# Patient Record
Sex: MALE | Race: BLACK OR AFRICAN AMERICAN | NOT HISPANIC OR LATINO | Employment: FULL TIME | URBAN - METROPOLITAN AREA
[De-identification: names, ages, dates, MRNs, and addresses within clinical notes are randomized per-mention and may not be internally consistent; named-entity substitution may affect disease eponyms.]

---

## 2017-06-27 ENCOUNTER — APPOINTMENT (OUTPATIENT)
Dept: OPHTHALMOLOGY | Age: 47
End: 2017-06-27

## 2017-06-27 ENCOUNTER — OFFICE VISIT (OUTPATIENT)
Dept: OPHTHALMOLOGY | Age: 47
End: 2017-06-27

## 2017-06-27 DIAGNOSIS — H40.003 GLAUCOMA SUSPECT OF BOTH EYES: Primary | ICD-10-CM

## 2017-06-27 PROCEDURE — 92133 CPTRZD OPH DX IMG PST SGM ON: CPT | Performed by: OPTOMETRIST

## 2017-06-27 PROCEDURE — 99213 OFFICE O/P EST LOW 20 MIN: CPT | Performed by: OPTOMETRIST

## 2017-06-27 PROCEDURE — 92083 EXTENDED VISUAL FIELD XM: CPT | Performed by: OPTOMETRIST

## 2017-06-27 ASSESSMENT — VISUAL ACUITY
OD_SC: 20/20
METHOD: SNELLEN - LINEAR
OS_SC: 20/20

## 2017-06-27 ASSESSMENT — TONOMETRY
OD_IOP_MMHG: 12
IOP_METHOD: APPLANATION
OS_IOP_MMHG: 12

## 2017-06-27 ASSESSMENT — PACHYMETRY
OD_CT(UM): 524
OS_CT(UM): 515

## 2018-01-05 ENCOUNTER — OFFICE VISIT (OUTPATIENT)
Dept: OPHTHALMOLOGY | Age: 48
End: 2018-01-05

## 2018-01-05 DIAGNOSIS — H40.9 MILD STAGE GLAUCOMA(365.71): Primary | ICD-10-CM

## 2018-01-05 PROCEDURE — 92014 COMPRE OPH EXAM EST PT 1/>: CPT | Performed by: OPTOMETRIST

## 2018-01-05 RX ORDER — LATANOPROST 50 UG/ML
1 SOLUTION/ DROPS OPHTHALMIC NIGHTLY
Qty: 2.5 ML | Refills: 12 | Status: SHIPPED | OUTPATIENT
Start: 2018-01-05 | End: 2019-01-05

## 2018-01-05 ASSESSMENT — VISUAL ACUITY
OS_SC: 20/20
METHOD: SNELLEN - LINEAR
OD_SC: 20/20

## 2018-01-05 ASSESSMENT — CONF VISUAL FIELD
OS_NORMAL: 1
OD_NORMAL: 1
METHOD: COUNTING FINGERS

## 2018-01-05 ASSESSMENT — TONOMETRY
IOP_METHOD: NON-CONTACT AIR PUFF
OS_IOP_MMHG: 11
OD_IOP_MMHG: 12

## 2018-01-05 ASSESSMENT — PACHYMETRY
OD_CT(UM): 524
OS_CT(UM): 515

## 2023-01-21 ENCOUNTER — NURSE TRIAGE (OUTPATIENT)
Dept: OTHER | Facility: CLINIC | Age: 53
End: 2023-01-21

## 2023-01-21 NOTE — TELEPHONE ENCOUNTER
Location of patient: [place]    Subjective: Caller states \"A few weeks ago I went to the ER because I was having really bad ear pain, they gave me drops for swimmers ear for 10 days. Everything went back to normal but then for the past week I have a clogged ear and causing my hearing to be different, like muffled\"     Current Symptoms: left ear congestion, causing muffled hearing. Recent diagnosis of swimmers ear but symptoms had resolved after using ear drops x 10 days. No pain, drainage, or fevers    Onset: 1 week ago; intermittent    Associated Symptoms: NA    Pain Severity: 0/10; N/A; none    Temperature: no    What has been tried: blew nose and ear cleared for a few minutes     LMP: NA Pregnant: NA    Recommended disposition: See PCP within 3 Days    Care advice provided, patient verbalizes understanding; denies any other questions or concerns; instructed to call back for any new or worsening symptoms. Patient/caller agrees to follow-up with PCP     This triage is a result of a call to [address] [place] [address]. Please do not respond to the triage nurse through this encounter. Any subsequent communication should be directly with the patient.         Reason for Disposition   Ear congestion present > 48 hours    Protocols used: Ear - Congestion-ADULT-